# Patient Record
Sex: FEMALE | Race: WHITE | NOT HISPANIC OR LATINO | Employment: OTHER | URBAN - METROPOLITAN AREA
[De-identification: names, ages, dates, MRNs, and addresses within clinical notes are randomized per-mention and may not be internally consistent; named-entity substitution may affect disease eponyms.]

---

## 2021-04-13 DIAGNOSIS — Z23 ENCOUNTER FOR IMMUNIZATION: ICD-10-CM

## 2025-03-05 ENCOUNTER — TELEPHONE (OUTPATIENT)
Age: 70
End: 2025-03-05

## 2025-03-05 NOTE — TELEPHONE ENCOUNTER
Pt calling in to schedule NP appt for neuropathy/ Scheduled 6/2/25 @ 9:30 with Dr Tellez in [facility] office.

## 2025-06-02 ENCOUNTER — OFFICE VISIT (OUTPATIENT)
Dept: NEUROLOGY | Facility: CLINIC | Age: 70
End: 2025-06-02
Payer: MEDICARE

## 2025-06-02 VITALS
WEIGHT: 145.4 LBS | DIASTOLIC BLOOD PRESSURE: 84 MMHG | HEIGHT: 61 IN | RESPIRATION RATE: 18 BRPM | OXYGEN SATURATION: 98 % | HEART RATE: 63 BPM | SYSTOLIC BLOOD PRESSURE: 118 MMHG | BODY MASS INDEX: 27.45 KG/M2

## 2025-06-02 DIAGNOSIS — R20.0 NUMBNESS AND TINGLING OF BOTH FEET: Primary | ICD-10-CM

## 2025-06-02 DIAGNOSIS — R20.2 NUMBNESS AND TINGLING OF BOTH FEET: Primary | ICD-10-CM

## 2025-06-02 PROCEDURE — 99204 OFFICE O/P NEW MOD 45 MIN: CPT | Performed by: PSYCHIATRY & NEUROLOGY

## 2025-06-02 RX ORDER — FLUTICASONE PROPIONATE 50 MCG
2 SPRAY, SUSPENSION (ML) NASAL DAILY
COMMUNITY

## 2025-06-02 RX ORDER — LOSARTAN POTASSIUM 25 MG/1
25 TABLET ORAL DAILY
COMMUNITY

## 2025-06-02 RX ORDER — LEVOTHYROXINE SODIUM 25 UG/1
12.5 TABLET ORAL DAILY
COMMUNITY

## 2025-06-02 RX ORDER — DIPHENOXYLATE HYDROCHLORIDE AND ATROPINE SULFATE 2.5; .025 MG/1; MG/1
1 TABLET ORAL DAILY
COMMUNITY

## 2025-06-02 NOTE — PROGRESS NOTES
Neurology Ambulatory Visit  Name: Bell Sampson       : 1955       MRN: [id number]   Encounter Provider: Nasri Tellez MD   Encounter Date: 2025  Encounter department: [facility]      Bell Sampson is a 69 y.o. female.       Assessment & Plan  Numbness and tingling of both feet       Differential diagnosis discussed with the patient, most likely peripheral neuropathy etiology to be determined, would recommend getting blood work to rule out reversible causes of neuropathy also would recommend an EMG of bilateral lower extremities procedure was explained to the patient in detail she is agreeable, discussed with her regarding medications and the gabapentin she would like to hold off on that for now, she was advised to eat a healthy nutritious diet to take a multivitamin every day to take fall and safety precautions to keep her blood pressure, cholesterol, sugar under control to go to the hospital if has any worsening symptoms and call me otherwise to see me back in 3 to 4 months or sooner if needed and follow-up with the other physicians.      Subjective:      HISTORY OF PRESENT ILLNESS    69-year-old right-handed pleasant female past medical history of hypertension, hypothyroidism,is here for evaluation of numbness and tingling in both feet for the last 1 year right leg worse than the left leg, she denies any back pain and no numbness or tingling in the upper extremities, she has cramping of her toes at nighttime and feels that her symptoms are worse at nighttime, denies any history of diabetes, she drinks alcohol socially not regularly, no family history of any neurological conditions denies any back pain, she denies any falls, no recent illnesses, her appetite has been good, weight has been stable, sleep has been good, she is currently not on any medications for the numbness or tingling, no other complaints.             Past Medical History:    Past Medical  "History[1]  Past Surgical History[2]    Family History:  Family History[3]    Social History:  Social History[4]   Living situation:    Work:      Allergies:  Allergies[5]    Medications:  Current Medications[6]    Objective:  Vitals:    06/02/25 0936   BP: 118/84   BP Location: Right arm   Patient Position: Sitting   Cuff Size: Standard   Pulse: 63   Resp: 18   SpO2: 98%   Weight: 66 kg (145 lb 6.4 oz)   Height: 5' 1\" (1.549 m)        Labs  I have reviewed pertinent labs:  CBC: No results found for: \"WBC\", \"RBC\", \"HGB\", \"HCT\", \"MCV\", \"PLT\", \"ADJUSTEDWBC\", \"MCH\", \"MCHC\", \"RDW\", \"MPV\", \"NEUTROABS\"  CMP:   Lab Results   Component Value Date    SODIUM 141 03/12/2025    K 4.4 03/12/2025     03/12/2025    CO2 28 03/12/2025    AGAP 9 03/12/2025    BUN 15 03/12/2025    CREATININE 0.73 03/12/2025    GLUC 102 (H) 03/12/2025    CALCIUM 9.4 03/12/2025    AST 26 03/12/2025    ALT 23 03/12/2025    ALKPHOS 53 03/12/2025    TP 6.8 03/12/2025    ALB 4.5 03/12/2025    TBILI 0.7 03/12/2025    EGFR 89 03/12/2025     Thyroid Studies:   Lab Results   Component Value Date    T3FREE 2.73 12/01/2023    FREET4 0.91 03/06/2024     Hemoglobin A1C/EST AVG Glucose   Lab Results   Component Value Date    HGBA1C 5.5 09/10/2024     09/10/2024     Vitamin B12 No results found for: \"DKLNARSL15\"  Folate No results found for: \"FOLATE\"           General Exam  GENERAL APPEARANCE:  No distress, alert, interactive and cooperative.  CARDIOVASCULAR: Warm and well perfused  LUNGS: normal work of breathing on room air  EXTREMITIES: no peripheral edema     Neurologic Exam  MENTAL STATE:  Orientation was normal to time, place and person without aphasia or apraxia. Recent and remote memory was intact.  Attention span and concentration were normal. Language testing was normal for comprehension, repetition, expression, and naming.     CRANIAL NERVES:  CN 2       visual fields full to confrontation.  CN 3, 4, 6  Pupils round, 4 mm in diameter, equally " reactive to light. Lids symmetric; no ptosis. EOMs normal alignment, full range. No nystagmus.  CN 5  Facial sensation intact bilaterally.  CN 7  Normal and symmetric facial strength.   CN 8  Hearing intact to finger rub bilaterally.  CN 9  Palate elevates symmetrically.  CN 11  Normal strength of shoulder shrug and neck turning.  CN 12  Tongue midline, with normal bulk and strength.     MOTOR:  Motor exam was normal. Muscle bulk and tone were normal in both upper and lower extremities. Muscle strength was 5/5 in distal and proximal muscles in both upper and lower extremities. No fasciculations, tremor or other abnormal movements were present.     REFLEXES:  RIGHT UE   LEFT UE  BR:2              BR:2    Biceps:2      Biceps:2    Triceps:2     Triceps:2       RIGHT LLE   LEFT LLE    Knee:2           Knee:2    Ankle:2         Ankle:2    Babinski: toes downgoing to plantar stimulation. No clonus.     SENSORY:  Decreased light touch pinprick temperature sensation in a stocking distribution. Cortical function is intact.    COORDINATION:   Coordination exam was normal. In both upper extremities, finger-nose-finger was intact without dysmetria or overshoot.      GAIT:  Gait was normal. Station was stable with a normal base. Gait was stable with a normal arm swing and speed. Tandem gait was intact. No Romberg sign was present.      ROS:  Review of Systems   Constitutional: Negative.  Negative for appetite change, fatigue and fever.   HENT: Negative.  Negative for hearing loss, tinnitus, trouble swallowing and voice change.    Eyes: Negative.  Negative for photophobia, pain and visual disturbance.   Respiratory: Negative.  Negative for shortness of breath.    Cardiovascular: Negative.  Negative for palpitations.   Gastrointestinal: Negative.  Negative for nausea and vomiting.   Endocrine: Negative.  Negative for cold intolerance.   Genitourinary: Negative.  Negative for dysuria, frequency and urgency.   Musculoskeletal:  Negative.  Negative for back pain, gait problem, myalgias, neck pain and neck stiffness.   Skin: Negative.  Negative for rash.   Allergic/Immunologic: Negative.    Neurological:  Positive for numbness (right ankle down to the toes and left foot is just the toes). Negative for dizziness, tremors, seizures, syncope, facial asymmetry, speech difficulty, weakness, light-headedness and headaches.   Hematological: Negative.  Does not bruise/bleed easily.   Psychiatric/Behavioral: Negative.  Negative for confusion, hallucinations and sleep disturbance.        I have reviewed the past medical history, surgical history, social and family history, current medications, allergies vitals, review of systems, and updated this information as appropriate today.    Administrative Statements   The total amount of time spent with the patient and on chart review and documentation was minutes. Issues addressed during this clinic visit included overall management, medication counseling or monitoring, and counseling and coordination of care.         Nasir Tellez MD                [1] No past medical history on file.  [2] No past surgical history on file.  [3] No family history on file.  [4]    [5] No Known Allergies  [6]   Current Outpatient Medications:     Diclofenac Sodium (VOLTAREN) 1 %, Apply 2 g topically 4 (four) times a day, Disp: , Rfl:     fluticasone (FLONASE) 50 mcg/act nasal spray, 2 sprays into each nostril daily, Disp: , Rfl:     levothyroxine 25 mcg tablet, Take 12.5 mcg by mouth daily, Disp: , Rfl:     losartan (COZAAR) 25 mg tablet, Take 25 mg by mouth daily, Disp: , Rfl:     multivitamin (THERAGRAN) TABS, Take 1 tablet by mouth daily, Disp: , Rfl:

## 2025-06-02 NOTE — ASSESSMENT & PLAN NOTE
Differential diagnosis discussed with the patient, most likely peripheral neuropathy etiology to be determined, would recommend getting blood work to rule out reversible causes of neuropathy also would recommend an EMG of bilateral lower extremities procedure was explained to the patient in detail she is agreeable, discussed with her regarding medications and the gabapentin she would like to hold off on that for now, she was advised to eat a healthy nutritious diet to take a multivitamin every day to take fall and safety precautions to keep her blood pressure, cholesterol, sugar under control to go to the hospital if has any worsening symptoms and call me otherwise to see me back in 3 to 4 months or sooner if needed and follow-up with the other physicians.

## 2025-07-15 ENCOUNTER — HOSPITAL ENCOUNTER (OUTPATIENT)
Age: 70
Discharge: HOME | End: 2025-07-15
Attending: PSYCHIATRY & NEUROLOGY
Payer: MEDICARE

## 2025-07-15 DIAGNOSIS — R20.0 NUMBNESS AND TINGLING OF BOTH FEET: ICD-10-CM

## 2025-07-15 DIAGNOSIS — R20.2 NUMBNESS AND TINGLING OF BOTH FEET: ICD-10-CM

## 2025-07-15 PROCEDURE — 95886 MUSC TEST DONE W/N TEST COMP: CPT | Performed by: PHYSICAL MEDICINE & REHABILITATION

## 2025-07-15 PROCEDURE — 95911 NRV CNDJ TEST 9-10 STUDIES: CPT | Performed by: PHYSICAL MEDICINE & REHABILITATION
